# Patient Record
Sex: FEMALE | Race: WHITE
[De-identification: names, ages, dates, MRNs, and addresses within clinical notes are randomized per-mention and may not be internally consistent; named-entity substitution may affect disease eponyms.]

---

## 2019-12-26 ENCOUNTER — HOSPITAL ENCOUNTER (INPATIENT)
Dept: HOSPITAL 92 - ERS | Age: 84
LOS: 2 days | Discharge: HOME HEALTH | DRG: 871 | End: 2019-12-28
Attending: EMERGENCY MEDICINE | Admitting: EMERGENCY MEDICINE
Payer: MEDICARE

## 2019-12-26 VITALS — BODY MASS INDEX: 20.1 KG/M2

## 2019-12-26 DIAGNOSIS — Z98.51: ICD-10-CM

## 2019-12-26 DIAGNOSIS — Z66: ICD-10-CM

## 2019-12-26 DIAGNOSIS — E78.5: ICD-10-CM

## 2019-12-26 DIAGNOSIS — Z87.891: ICD-10-CM

## 2019-12-26 DIAGNOSIS — E78.00: ICD-10-CM

## 2019-12-26 DIAGNOSIS — Z88.8: ICD-10-CM

## 2019-12-26 DIAGNOSIS — Z79.899: ICD-10-CM

## 2019-12-26 DIAGNOSIS — J44.0: ICD-10-CM

## 2019-12-26 DIAGNOSIS — J44.1: ICD-10-CM

## 2019-12-26 DIAGNOSIS — I50.9: ICD-10-CM

## 2019-12-26 DIAGNOSIS — Z88.0: ICD-10-CM

## 2019-12-26 DIAGNOSIS — J18.9: ICD-10-CM

## 2019-12-26 DIAGNOSIS — E03.9: ICD-10-CM

## 2019-12-26 DIAGNOSIS — E87.1: ICD-10-CM

## 2019-12-26 DIAGNOSIS — M19.90: ICD-10-CM

## 2019-12-26 DIAGNOSIS — A41.9: Primary | ICD-10-CM

## 2019-12-26 DIAGNOSIS — N17.9: ICD-10-CM

## 2019-12-26 DIAGNOSIS — Z90.710: ICD-10-CM

## 2019-12-26 DIAGNOSIS — Z79.890: ICD-10-CM

## 2019-12-26 DIAGNOSIS — I11.0: ICD-10-CM

## 2019-12-26 DIAGNOSIS — Z90.49: ICD-10-CM

## 2019-12-26 LAB
ALBUMIN SERPL BCG-MCNC: 3.7 G/DL (ref 3.4–4.8)
ALP SERPL-CCNC: 115 U/L (ref 40–110)
ALT SERPL W P-5'-P-CCNC: (no result) U/L (ref 8–55)
ANION GAP SERPL CALC-SCNC: 13 MMOL/L (ref 10–20)
AST SERPL-CCNC: 20 U/L (ref 5–34)
BILIRUB SERPL-MCNC: 0.9 MG/DL (ref 0.2–1.2)
BUN SERPL-MCNC: 21 MG/DL (ref 9.8–20.1)
CALCIUM SERPL-MCNC: 9.3 MG/DL (ref 7.8–10.44)
CHLORIDE SERPL-SCNC: 91 MMOL/L (ref 98–107)
CK MB SERPL-MCNC: 2.2 NG/ML (ref 0–6.6)
CO2 SERPL-SCNC: 28 MMOL/L (ref 23–31)
CREAT CL PREDICTED SERPL C-G-VRATE: 0 ML/MIN (ref 70–130)
GLOBULIN SER CALC-MCNC: 2.7 G/DL (ref 2.4–3.5)
GLUCOSE SERPL-MCNC: 186 MG/DL (ref 83–110)
HGB BLD-MCNC: 13.2 G/DL (ref 12–16)
MCH RBC QN AUTO: 32.4 PG (ref 27–31)
MCV RBC AUTO: 97.8 FL (ref 78–98)
MDIFF COMPLETE?: YES
PLATELET # BLD AUTO: 286 THOU/UL (ref 130–400)
POTASSIUM SERPL-SCNC: 4.2 MMOL/L (ref 3.5–5.1)
RBC # BLD AUTO: 4.07 MILL/UL (ref 4.2–5.4)
SODIUM SERPL-SCNC: 128 MMOL/L (ref 136–145)
TROPONIN I SERPL DL<=0.01 NG/ML-MCNC: (no result) NG/ML (ref ?–0.03)
TROPONIN I SERPL DL<=0.01 NG/ML-MCNC: 0.05 NG/ML (ref ?–0.03)
WBC # BLD AUTO: 23.7 THOU/UL (ref 4.8–10.8)

## 2019-12-26 PROCEDURE — 96367 TX/PROPH/DG ADDL SEQ IV INF: CPT

## 2019-12-26 PROCEDURE — 85025 COMPLETE CBC W/AUTO DIFF WBC: CPT

## 2019-12-26 PROCEDURE — 96365 THER/PROPH/DIAG IV INF INIT: CPT

## 2019-12-26 PROCEDURE — 96366 THER/PROPH/DIAG IV INF ADDON: CPT

## 2019-12-26 PROCEDURE — 82553 CREATINE MB FRACTION: CPT

## 2019-12-26 PROCEDURE — 83605 ASSAY OF LACTIC ACID: CPT

## 2019-12-26 PROCEDURE — 71045 X-RAY EXAM CHEST 1 VIEW: CPT

## 2019-12-26 PROCEDURE — 80048 BASIC METABOLIC PNL TOTAL CA: CPT

## 2019-12-26 PROCEDURE — 36415 COLL VENOUS BLD VENIPUNCTURE: CPT

## 2019-12-26 PROCEDURE — 80053 COMPREHEN METABOLIC PANEL: CPT

## 2019-12-26 PROCEDURE — 93005 ELECTROCARDIOGRAM TRACING: CPT

## 2019-12-26 PROCEDURE — 94640 AIRWAY INHALATION TREATMENT: CPT

## 2019-12-26 PROCEDURE — 87040 BLOOD CULTURE FOR BACTERIA: CPT

## 2019-12-26 PROCEDURE — 84484 ASSAY OF TROPONIN QUANT: CPT

## 2019-12-26 PROCEDURE — 94760 N-INVAS EAR/PLS OXIMETRY 1: CPT

## 2019-12-26 PROCEDURE — 87899 AGENT NOS ASSAY W/OPTIC: CPT

## 2019-12-26 PROCEDURE — 96375 TX/PRO/DX INJ NEW DRUG ADDON: CPT

## 2019-12-26 PROCEDURE — 87804 INFLUENZA ASSAY W/OPTIC: CPT

## 2019-12-26 RX ADMIN — CEFTRIAXONE SCH MLS: 1 INJECTION, POWDER, FOR SOLUTION INTRAMUSCULAR; INTRAVENOUS at 17:50

## 2019-12-26 NOTE — RAD
RADIOGRAPH CHEST 1 VIEW:



DATE:

12/26/2019



TIME:

9:22 AM



HISTORY:

87-year-old female with productive cough and dyspnea



COMPARISON:

none



FINDINGS:

There is a mild infiltrate at the right lung base. No cardiomegaly or pulmonary edema. Left lung is c
lear. Possible small right pleural effusion. No pneumothorax.



IMPRESSION:

Evidence for pneumonia at the right lung base.



Reported By: Cl Rodriguez 

Electronically Signed:  12/26/2019 9:47 AM

## 2019-12-26 NOTE — HP
PRIMARY CARE PHYSICIAN:  Out of town.



CHIEF COMPLAINT:  Shortness of breath, cough, and weakness x5-6 days.



HISTORY OF PRESENT ILLNESS:  An 87-year-old female who was visiting the town with a

past medical history of COPD, prior nicotine dependence, hypertension, dyslipidemia,

CHF of unspecified type, unspecified heart murmur, hypothyroidism, osteoarthritis,

who was brought in to [facility] by her son for a 6-day history of worsening

dyspnea and increased weakness associated with intermittent nonproductive cough,

difficulty in expectorating, not relieved with 3 times per day albuterol nebulizer

treatments, and complains of increased sinus drainage, prompting ER evaluation.  The

patient denies any headaches, fevers, chills, nausea, vomiting, diarrhea, dizziness

or disorientation, near-syncope, or syncope.  She denies any recent sick contacts.

She has received influenza vaccine this season.  She denies any changes to her

medication regimen.  In the ER, T-max was afebrile.  Initial lactic acid was 3.2

with white blood cell count of 23.7.  Chemistries revealing hyponatremia with BUN

and creatinine of 21/1.37 and chest x-ray, one view, revealing right lower lobe

infiltrate.  Influenza antigen was negative.  The patient was administered 1 L

normal saline bolus, nebulized bronchodilator, and 25 mg IV Solu-Medrol, IV

Levaquin, IV cefepime, and admitted for further inpatient evaluation for right lower

lobe pneumonia and COPD exacerbation.  Oxygen saturation has not been hypoxic. 



At bedside, the patient reports feeling a little better since arrival.  She offers

no other acute complaints and feels overall weak.  She notes okay appetite.  She

denies any earaches or headache complaints.  She recently traveled from __________

to [place] via airplane and denies any prolonged immobility, lower extremity

asymmetry or pain, and denies any personal or family history of venous

thromboembolism. 



PAST MEDICAL HISTORY:  COPD, prior nicotine dependence, hypertension, dyslipidemia,

congestive heart failure unspecified time, unspecified murmur, hypothyroidism,

osteoarthritis. 



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



SOCIAL HISTORY:  The patient admits to prior tobacco use, none currently.  She uses

a walker at baseline.  She does not use home oxygen. 



ALLERGIES:  LISTED TO PENICILLIN, STREPTOMYCIN, XANAX, AND TYLENOL.





REVIEW OF SYSTEMS:  Pertinent positives as per HPI.  Remainder review of systems

negative. 



HOME MEDICATIONS:  Reviewed as per admission medication reconciliation.



FAMILY HISTORY:  The patient's father  from heart disease at age 55.  The

patient has family history of lung disease as well too. 



PHYSICAL EXAMINATION:

VITAL SIGNS:  T-max afebrile 99.2, pulse 85 to 101, blood pressure ranges from

129/63 to 179/84, oxygen 94% to 97% on room air, respirations 14 to 16 and

unlabored. 

GENERAL APPEARANCE:  This is an elderly, frail-appearing female, who is awake,

alert, oriented, malaise in appearance. 

HEENT:  Normocephalic, atraumatic.  There is chronic facial asymmetry.  Pupils

equally round.  Extraocular muscles intact. 

NECK:  Supple. 

CARDIOVASCULAR:  S1 and S2, regular rate and rhythm.  Harsh murmur noted in the left

parasternal border.  No chest wall tenderness to palpation. 

LUNGS:  Nonlabored respiration on bilateral posterior auscultation, diminished

inspiratory effort with poor air movement and coarse breath sounds throughout,

presumably in the bases, right greater than left. 

ABDOMEN:  Soft, nontender, nondistended. 

EXTREMITIES:  No edema, cyanosis, or deformity. 

SKIN:  Warm to touch without rash or pallor or abrasion.



LABORATORY DATA:  WBC 23.7, H and H 13.2/39.8, and platelets 286.  Sodium 128,

potassium 4.2, chloride 91, bicarb 28, glucose 186, BUN and creatinine 21/1.37, and

GFR 36.  Lactic acid 3.2 with repeat pending.  Troponin-I 0.033 with repeat pending.

 Influenza antigen negative. 



IMAGING STUDIES:  One-view chest x-ray personally reviewed, reveals evidence for

pneumonia at the right lung base.  No cardiomegaly or pulmonary edema. 



ASSESSMENT:  

1. Acute exacerbation of chronic obstructive pulmonary disease secondary to

community-acquired pneumonia. 

2. Community-acquired pneumonia.

3. Hyponatremia.

4. Elevated creatinine of unclear acuity, possibly acute kidney injury.

5. Generalized weakness and deconditioning.

6. Congestive heart failure history of unspecified type, not in exacerbation.

7. Unspecified heart murmur.

8. Hypertension, uncontrolled.

9. Dyslipidemia.

10. Hypothyroidism.



PLAN:  

1. The patient will be admitted as inpatient status and placed on telemetry

monitoring for acute COPD exacerbation secondary to community-acquired pneumonia.

We will continue with IV Rocephin, IV Zithromax, continue parenteral

glucocorticoids, continue nebulized bronchodilators, continue low rate isotonic

saline, and monitor for clinical improvement.  We will repeat a.m. labs on

2019.  Home medications will be reconciled once list is available.  We will

monitor renal function of unclear acuity.  Plan of care was discussed with the

patient and her son at bedside. 

2. DVT prophylaxis:  Renally dosed Lovenox.

3. Code status:  DNR/DNI as discussed with patient who reports having advanced

directives. 

4. Check a.m. labs.







Job ID:  [id number]

## 2019-12-27 LAB
ANION GAP SERPL CALC-SCNC: 15 MMOL/L (ref 10–20)
BUN SERPL-MCNC: 17 MG/DL (ref 9.8–20.1)
CALCIUM SERPL-MCNC: 8.1 MG/DL (ref 7.8–10.44)
CHLORIDE SERPL-SCNC: 98 MMOL/L (ref 98–107)
CO2 SERPL-SCNC: 20 MMOL/L (ref 23–31)
CREAT CL PREDICTED SERPL C-G-VRATE: 31 ML/MIN (ref 70–130)
GLUCOSE SERPL-MCNC: 138 MG/DL (ref 83–110)
HGB BLD-MCNC: 11 G/DL (ref 12–16)
MCH RBC QN AUTO: 32.4 PG (ref 27–31)
MCV RBC AUTO: 96.8 FL (ref 78–98)
MDIFF COMPLETE?: YES
PLATELET # BLD AUTO: 242 THOU/UL (ref 130–400)
POTASSIUM SERPL-SCNC: 4.1 MMOL/L (ref 3.5–5.1)
RBC # BLD AUTO: 3.4 MILL/UL (ref 4.2–5.4)
SODIUM SERPL-SCNC: 129 MMOL/L (ref 136–145)
WBC # BLD AUTO: 14.3 THOU/UL (ref 4.8–10.8)

## 2019-12-27 RX ADMIN — AZITHROMYCIN SCH MLS: 500 INJECTION, POWDER, LYOPHILIZED, FOR SOLUTION INTRAVENOUS at 08:48

## 2019-12-27 RX ADMIN — CEFTRIAXONE SCH MLS: 1 INJECTION, POWDER, FOR SOLUTION INTRAMUSCULAR; INTRAVENOUS at 16:55

## 2019-12-27 NOTE — PDOC.HOSPP
- Subjective


Encounter Date: 12/27/19


Encounter Time: 11:38


Subjective: 





cc: followup for sepsis, cap, copd exacerbation





subjective: patient seen at bedside. son present. patient feels better. still 

feels weak and short of breath. has difficulty expectorating phlegm. felt that 

breathing treatments helped. nurse notes no acute events. patient wants home 

medications restarted. 





- Objective


Vital Signs & Weight: 


 Vital Signs (12 hours)











  Temp Pulse Resp BP Pulse Ox


 


 12/27/19 10:19   87  16  


 


 12/27/19 08:22  98.6 F  87  18  182/86 H  93 L


 


 12/27/19 07:26   91  20  


 


 12/27/19 04:24   88  18  168/80 H 


 


 12/27/19 04:02  97.3 F L  85  20  182/86 H  94 L


 


 12/27/19 00:00   89   163/79 H 








 Weight











Weight                         96 lb 8 oz














I&O: 


 











 12/26/19 12/27/19 12/28/19





 06:59 06:59 06:59


 


Intake Total  1022 


 


Output Total  350 


 


Balance  672 











Result Diagrams: 


 12/27/19 04:48





 12/27/19 04:48





Hospitalist ROS





- Review of Systems


Other: 





ROS: pertinent positives per SUBJECTIVE; remainder ROS negative





- Medication


Medications: 


Active Medications











Generic Name Dose Route Start Last Admin





  Trade Name Freq  PRN Reason Stop Dose Admin


 


Albuterol/Ipratropium  3 ml  12/26/19 14:46  12/27/19 10:19





  Duoneb  EZPAP   3 ml





  [id number] PRN   Administration





  SOB &/or Wheezing   





     





     





     


 


Enoxaparin Sodium  40 mg  12/27/19 09:00  12/27/19 08:48





  Lovenox  SC   40 mg





  0900 PAT   Administration





     





     





     





     


 


Azithromycin 500 mg/ Sodium  250 mls @ 250 mls/hr  12/27/19 09:00  12/27/19 08:

48





  Chloride  IVPB   250 mls





  Q24HR PAT   Administration





     





     





     





     


 


Ceftriaxone Sodium 1 gm/  100 mls @ 200 mls/hr  12/26/19 16:00  12/26/19 17:50





  Sodium Chloride  IVPB   100 mls





  Q24HR PAT   Administration





     





     





     





     


 


Sodium Chloride  1,000 mls @ 75 mls/hr  12/26/19 15:00  12/27/19 05:57





  Normal Saline 0.9%  IV   1,000 mls





  [id number] PAT   Administration





     





     





     





     


 


Methylprednisolone Sodium Succinate  40 mg  12/26/19 22:00  12/27/19 05:57





  Solu-Medrol  IVP   40 mg





  Q8HR PAT   Administration





     





     





     





     














- Exam


General Appearance: awake alert


Eye: PERRL, anicteric sclera


ENT: normocephalic atraumatic, no oropharyngeal lesions


Neck: supple


Heart: RRR, no murmur


Respiratory: no rales, tachypneic


Respiratory - other findings: decreased right basilar breath sounds. no 

significant wheezing. 


Extremities: no cyanosis, no clubbing, no edema


Skin: normal turgor, no lesions, no rashes


Neurological: cranial nerve grossly intact, normal sensation to touch, no focal 

deficits


Musculoskeletal: generalized weakness


Psychiatric: normal behavior, A&O x 3, oriented to person, oriented to place





Hosp A/P





- Plan





ASSESSMENT AND PLAN:





sepsis due to CAP. continue IV rocephin, IV zithromax, wean IV steroids, 

continue IVF, start scheduled nebulizers, start mucinex DM, Claritin, consult 

PT and OT, check ambulatory oxygen and monitor for symptom improvement





COPD exacerbation. wean IV steroids, continue nebulizers. RPP negative. 





Sinus tachycardia. restart atenolol





Hypertension, restart home medications





DVT px: LMWH





Code status: DNR and DNI





dispo: continue inpatient monitoring. discharge planning

## 2019-12-28 VITALS — DIASTOLIC BLOOD PRESSURE: 69 MMHG | TEMPERATURE: 97.8 F | SYSTOLIC BLOOD PRESSURE: 169 MMHG

## 2019-12-28 RX ADMIN — AZITHROMYCIN SCH MLS: 500 INJECTION, POWDER, LYOPHILIZED, FOR SOLUTION INTRAVENOUS at 09:01
